# Patient Record
Sex: MALE | Race: BLACK OR AFRICAN AMERICAN | NOT HISPANIC OR LATINO | Employment: FULL TIME | URBAN - METROPOLITAN AREA
[De-identification: names, ages, dates, MRNs, and addresses within clinical notes are randomized per-mention and may not be internally consistent; named-entity substitution may affect disease eponyms.]

---

## 2017-09-25 ENCOUNTER — HOSPITAL ENCOUNTER (EMERGENCY)
Facility: HOSPITAL | Age: 67
Discharge: ANOTHER HOSPITAL | End: 2017-09-25
Attending: EMERGENCY MEDICINE
Payer: MEDICARE

## 2017-09-25 ENCOUNTER — HOSPITAL ENCOUNTER (OUTPATIENT)
Facility: HOSPITAL | Age: 67
Discharge: HOME | End: 2017-09-26
Attending: EMERGENCY MEDICINE | Admitting: EMERGENCY MEDICINE
Payer: MEDICARE

## 2017-09-25 VITALS
WEIGHT: 265 LBS | HEART RATE: 100 BPM | OXYGEN SATURATION: 94 % | RESPIRATION RATE: 18 BRPM | TEMPERATURE: 99 F | HEIGHT: 77 IN | BODY MASS INDEX: 31.29 KG/M2 | DIASTOLIC BLOOD PRESSURE: 98 MMHG | SYSTOLIC BLOOD PRESSURE: 181 MMHG

## 2017-09-25 DIAGNOSIS — R41.82 ALTERED MENTAL STATUS: ICD-10-CM

## 2017-09-25 DIAGNOSIS — G93.40 ENCEPHALOPATHY ACUTE: ICD-10-CM

## 2017-09-25 DIAGNOSIS — R56.9 SEIZURE: ICD-10-CM

## 2017-09-25 DIAGNOSIS — R41.82 ALTERED MENTAL STATUS, UNSPECIFIED ALTERED MENTAL STATUS TYPE: ICD-10-CM

## 2017-09-25 DIAGNOSIS — I35.9 NONRHEUMATIC AORTIC VALVE DISORDER: ICD-10-CM

## 2017-09-25 DIAGNOSIS — I10 BENIGN ESSENTIAL HTN: ICD-10-CM

## 2017-09-25 DIAGNOSIS — R56.9 SEIZURES: Primary | ICD-10-CM

## 2017-09-25 LAB
ALBUMIN SERPL BCP-MCNC: 3.8 G/DL
ALP SERPL-CCNC: 112 U/L
ALT SERPL W/O P-5'-P-CCNC: 34 U/L
AMPHET+METHAMPHET UR QL: NEGATIVE
ANION GAP SERPL CALC-SCNC: 21 MMOL/L
AST SERPL-CCNC: 35 U/L
BACTERIA #/AREA URNS HPF: NORMAL /HPF
BARBITURATES UR QL SCN>200 NG/ML: NEGATIVE
BASOPHILS # BLD AUTO: 0.03 K/UL
BASOPHILS NFR BLD: 0.2 %
BENZODIAZ UR QL SCN>200 NG/ML: NEGATIVE
BILIRUB SERPL-MCNC: 0.5 MG/DL
BILIRUB UR QL STRIP: NEGATIVE
BUN SERPL-MCNC: 13 MG/DL
BZE UR QL SCN: NEGATIVE
CALCIUM SERPL-MCNC: 9.7 MG/DL
CANNABINOIDS UR QL SCN: NEGATIVE
CHLORIDE SERPL-SCNC: 104 MMOL/L
CK SERPL-CCNC: 125 U/L
CLARITY UR: CLEAR
CO2 SERPL-SCNC: 12 MMOL/L
COLOR UR: YELLOW
CREAT SERPL-MCNC: 1.4 MG/DL
CREAT UR-MCNC: 84 MG/DL
DIFFERENTIAL METHOD: ABNORMAL
EOSINOPHIL # BLD AUTO: 0.2 K/UL
EOSINOPHIL NFR BLD: 1 %
ERYTHROCYTE [DISTWIDTH] IN BLOOD BY AUTOMATED COUNT: 13.2 %
EST. GFR  (AFRICAN AMERICAN): 60 ML/MIN/1.73 M^2
EST. GFR  (NON AFRICAN AMERICAN): 52 ML/MIN/1.73 M^2
ETHANOL SERPL-MCNC: <10 MG/DL
GLUCOSE SERPL-MCNC: 201 MG/DL
GLUCOSE UR QL STRIP: NEGATIVE
HCT VFR BLD AUTO: 47.9 %
HGB BLD-MCNC: 16.1 G/DL
HGB UR QL STRIP: ABNORMAL
HYALINE CASTS #/AREA URNS LPF: 0 /LPF
INR PPP: 1
KETONES UR QL STRIP: NEGATIVE
LACTATE SERPL-SCNC: 3.1 MMOL/L
LACTATE SERPL-SCNC: >12 MMOL/L
LEUKOCYTE ESTERASE UR QL STRIP: NEGATIVE
LYMPHOCYTES # BLD AUTO: 4.1 K/UL
LYMPHOCYTES NFR BLD: 27.4 %
MAGNESIUM SERPL-MCNC: 2.7 MG/DL
MCH RBC QN AUTO: 31.3 PG
MCHC RBC AUTO-ENTMCNC: 33.6 G/DL
MCV RBC AUTO: 93 FL
METHADONE UR QL SCN>300 NG/ML: NEGATIVE
MICROSCOPIC COMMENT: NORMAL
MONOCYTES # BLD AUTO: 0.6 K/UL
MONOCYTES NFR BLD: 3.7 %
NEUTROPHILS # BLD AUTO: 9.9 K/UL
NEUTROPHILS NFR BLD: 66.4 %
NITRITE UR QL STRIP: NEGATIVE
OPIATES UR QL SCN: NEGATIVE
PCP UR QL SCN>25 NG/ML: NEGATIVE
PH UR STRIP: 6 [PH] (ref 5–8)
PHOSPHATE SERPL-MCNC: 2.6 MG/DL
PLATELET # BLD AUTO: 279 K/UL
PMV BLD AUTO: 10 FL
POCT GLUCOSE: 88 MG/DL (ref 70–110)
POTASSIUM SERPL-SCNC: 4 MMOL/L
PROT SERPL-MCNC: 8.6 G/DL
PROT UR QL STRIP: ABNORMAL
PROTHROMBIN TIME: 10.6 SEC
RBC # BLD AUTO: 5.15 M/UL
RBC #/AREA URNS HPF: 2 /HPF (ref 0–4)
SODIUM SERPL-SCNC: 137 MMOL/L
SP GR UR STRIP: >=1.03 (ref 1–1.03)
SQUAMOUS #/AREA URNS HPF: 2 /HPF
TOXICOLOGY INFORMATION: NORMAL
TROPONIN I SERPL DL<=0.01 NG/ML-MCNC: <0.006 NG/ML
URN SPEC COLLECT METH UR: ABNORMAL
UROBILINOGEN UR STRIP-ACNC: NEGATIVE EU/DL
WBC # BLD AUTO: 14.91 K/UL
WBC #/AREA URNS HPF: 0 /HPF (ref 0–5)

## 2017-09-25 PROCEDURE — 80320 DRUG SCREEN QUANTALCOHOLS: CPT

## 2017-09-25 PROCEDURE — 84100 ASSAY OF PHOSPHORUS: CPT

## 2017-09-25 PROCEDURE — 25000003 PHARM REV CODE 250: Performed by: EMERGENCY MEDICINE

## 2017-09-25 PROCEDURE — 99285 EMERGENCY DEPT VISIT HI MDM: CPT | Mod: ,,, | Performed by: EMERGENCY MEDICINE

## 2017-09-25 PROCEDURE — 85025 COMPLETE CBC W/AUTO DIFF WBC: CPT

## 2017-09-25 PROCEDURE — 80053 COMPREHEN METABOLIC PANEL: CPT

## 2017-09-25 PROCEDURE — 82962 GLUCOSE BLOOD TEST: CPT

## 2017-09-25 PROCEDURE — 93005 ELECTROCARDIOGRAM TRACING: CPT

## 2017-09-25 PROCEDURE — G0378 HOSPITAL OBSERVATION PER HR: HCPCS

## 2017-09-25 PROCEDURE — 85610 PROTHROMBIN TIME: CPT

## 2017-09-25 PROCEDURE — 63600175 PHARM REV CODE 636 W HCPCS

## 2017-09-25 PROCEDURE — 81000 URINALYSIS NONAUTO W/SCOPE: CPT

## 2017-09-25 PROCEDURE — 80307 DRUG TEST PRSMV CHEM ANLYZR: CPT

## 2017-09-25 PROCEDURE — 96375 TX/PRO/DX INJ NEW DRUG ADDON: CPT

## 2017-09-25 PROCEDURE — 83605 ASSAY OF LACTIC ACID: CPT

## 2017-09-25 PROCEDURE — 93010 ELECTROCARDIOGRAM REPORT: CPT | Mod: ,,, | Performed by: INTERNAL MEDICINE

## 2017-09-25 PROCEDURE — 82550 ASSAY OF CK (CPK): CPT

## 2017-09-25 PROCEDURE — 96376 TX/PRO/DX INJ SAME DRUG ADON: CPT

## 2017-09-25 PROCEDURE — 95951 PR EEG MONITORING/VIDEORECORD: CPT | Mod: 26,52,, | Performed by: PSYCHIATRY & NEUROLOGY

## 2017-09-25 PROCEDURE — 25500020 PHARM REV CODE 255: Performed by: EMERGENCY MEDICINE

## 2017-09-25 PROCEDURE — 84484 ASSAY OF TROPONIN QUANT: CPT

## 2017-09-25 PROCEDURE — 96365 THER/PROPH/DIAG IV INF INIT: CPT

## 2017-09-25 PROCEDURE — 83735 ASSAY OF MAGNESIUM: CPT

## 2017-09-25 PROCEDURE — 99285 EMERGENCY DEPT VISIT HI MDM: CPT | Mod: 25

## 2017-09-25 PROCEDURE — 99220 PR INITIAL OBSERVATION CARE,LEVL III: CPT | Mod: ,,, | Performed by: PHYSICIAN ASSISTANT

## 2017-09-25 PROCEDURE — 99285 EMERGENCY DEPT VISIT HI MDM: CPT | Mod: 25,27

## 2017-09-25 PROCEDURE — 99285 EMERGENCY DEPT VISIT HI MDM: CPT | Mod: GC,,, | Performed by: PSYCHIATRY & NEUROLOGY

## 2017-09-25 PROCEDURE — 96361 HYDRATE IV INFUSION ADD-ON: CPT

## 2017-09-25 PROCEDURE — 63600175 PHARM REV CODE 636 W HCPCS: Performed by: EMERGENCY MEDICINE

## 2017-09-25 RX ORDER — LORAZEPAM 2 MG/ML
INJECTION INTRAMUSCULAR
Status: COMPLETED
Start: 2017-09-25 | End: 2017-09-25

## 2017-09-25 RX ORDER — SODIUM CHLORIDE 9 MG/ML
1000 INJECTION, SOLUTION INTRAVENOUS
Status: DISCONTINUED | OUTPATIENT
Start: 2017-09-25 | End: 2017-09-25 | Stop reason: HOSPADM

## 2017-09-25 RX ORDER — SODIUM CHLORIDE 9 MG/ML
1000 INJECTION, SOLUTION INTRAVENOUS
Status: COMPLETED | OUTPATIENT
Start: 2017-09-25 | End: 2017-09-25

## 2017-09-25 RX ADMIN — IOHEXOL 100 ML: 350 INJECTION, SOLUTION INTRAVENOUS at 01:09

## 2017-09-25 RX ADMIN — SODIUM CHLORIDE 1000 ML: 0.9 INJECTION, SOLUTION INTRAVENOUS at 10:09

## 2017-09-25 RX ADMIN — LORAZEPAM 2 MG: 2 INJECTION INTRAMUSCULAR; INTRAVENOUS at 08:09

## 2017-09-25 RX ADMIN — DEXTROSE 2000 MG: 50 INJECTION, SOLUTION INTRAVENOUS at 12:09

## 2017-09-25 RX ADMIN — DEXTROSE 2000 MG: 50 INJECTION, SOLUTION INTRAVENOUS at 05:09

## 2017-09-25 NOTE — ED NOTES
Pt resting on stretcher at this time. On cardiac, bp and o2 sat monitor. Family at bedside. SR up and CB in reach. No seizure activity since his arrival to ER.
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
## 2017-09-25 NOTE — ED NOTES
Pt alert but still having episodes of disorientation to place and time. On cardiac, bp and o2 sat monitor.  SR up and CB in reach. Family at bedside. No seizure activity noted.

## 2017-09-25 NOTE — ED NOTES
Pt identifiers checked and correct.    LOC: The patient is awake, alert and aware of environment with an appropriate affect, the patient is oriented x 3 and speaking appropriately.   APPEARANCE: Patient resting comfortably and in no acute distress, patient is clean and well groomed, patient's clothing is properly fastened.   SKIN: The skin is warm and dry, color consistent with ethnicity, patient has normal skin turgor and moist mucus membranes, skin intact, no breakdown or bruising noted.   MUSCULOSKELETAL: Patient moving all extremities spontaneously, no obvious swelling or deformities noted.   RESPIRATORY: Airway is open and patent, respirations are spontaneous, patient has a normal effort and rate, no accessory muscle use noted.   CARDIAC: Patient has a normal rate and regular rhythm, no periphreal edema noted, capillary refill < 3 seconds.   ABDOMEN: Soft and non tender to palpation, no distention noted, active bowel sounds present in all four quadrants.   NEUROLOGIC: PERRL, 3 mm bilaterally, eyes open spontaneously, behavior appropriate to situation, follows commands, facial expression symmetrical, bilateral hand grasp equal and even, purposeful motor response noted, normal sensation in all extremities when touched with a finger.  Sleepy yet AAOx3.  Follows all commands.

## 2017-09-25 NOTE — ED NOTES
Pt taken  to CT via stretcher per nurse and nurse tech. Pt is postictal and combative at this time. Soft restraints in place. Medical intervention ordered per Dr. Sawant to sedate pt for CT.

## 2017-09-25 NOTE — ED NOTES
Pt becoming more alert. Sleeping but awakens to name. Able to state his name and birthday. Family at bedside. On cardiac, bp and o2 sat monitor. SR up and CB in reach. Restraints discontinued at this time.
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
## 2017-09-25 NOTE — ED NOTES
Family at bedside. SR up and CB in reach. On cardiac, bp and o2 sat monitor. Oxygen on at 15L per nrb. Visible from nurses station. Restraints in place- f2f done at this time.

## 2017-09-25 NOTE — ED NOTES
Bed: 25  Expected date: 9/25/17  Expected time: 6:14 PM  Means of arrival:   Comments:  Transfer/ [facility]
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
## 2017-09-26 VITALS
RESPIRATION RATE: 16 BRPM | HEART RATE: 69 BPM | TEMPERATURE: 98 F | SYSTOLIC BLOOD PRESSURE: 185 MMHG | BODY MASS INDEX: 29.47 KG/M2 | DIASTOLIC BLOOD PRESSURE: 92 MMHG | WEIGHT: 242 LBS | HEIGHT: 76 IN | OXYGEN SATURATION: 95 %

## 2017-09-26 PROBLEM — R56.9 SEIZURES: Status: RESOLVED | Noted: 2017-09-25 | Resolved: 2017-09-26

## 2017-09-26 PROBLEM — E87.20 LACTIC ACIDOSIS: Status: RESOLVED | Noted: 2017-09-26 | Resolved: 2017-09-26

## 2017-09-26 PROBLEM — G93.40 ENCEPHALOPATHY ACUTE: Status: ACTIVE | Noted: 2017-09-26

## 2017-09-26 PROBLEM — R73.9 HYPERGLYCEMIA: Status: RESOLVED | Noted: 2017-09-26 | Resolved: 2017-09-26

## 2017-09-26 PROBLEM — I10 BENIGN ESSENTIAL HTN: Status: ACTIVE | Noted: 2017-09-26

## 2017-09-26 PROBLEM — R80.9 PROTEINURIA: Status: ACTIVE | Noted: 2017-09-26

## 2017-09-26 PROBLEM — E87.20 LACTIC ACIDOSIS: Status: ACTIVE | Noted: 2017-09-26

## 2017-09-26 PROBLEM — G93.40 ENCEPHALOPATHY ACUTE: Status: RESOLVED | Noted: 2017-09-26 | Resolved: 2017-09-26

## 2017-09-26 PROBLEM — R73.9 HYPERGLYCEMIA: Status: ACTIVE | Noted: 2017-09-26

## 2017-09-26 LAB
ALBUMIN SERPL BCP-MCNC: 3.1 G/DL
ALLENS TEST: ABNORMAL
ALP SERPL-CCNC: 87 U/L
ALT SERPL W/O P-5'-P-CCNC: 39 U/L
AMMONIA PLAS-SCNC: 38 UMOL/L
ANION GAP SERPL CALC-SCNC: 9 MMOL/L
AST SERPL-CCNC: 72 U/L
B-OH-BUTYR BLD STRIP-SCNC: 0.2 MMOL/L
BASOPHILS # BLD AUTO: 0.01 K/UL
BASOPHILS NFR BLD: 0.1 %
BILIRUB SERPL-MCNC: 1.4 MG/DL
BUN SERPL-MCNC: 15 MG/DL
CALCIUM SERPL-MCNC: 9.4 MG/DL
CHLORIDE SERPL-SCNC: 109 MMOL/L
CK SERPL-CCNC: 1884 U/L
CK SERPL-CCNC: 1899 U/L
CO2 SERPL-SCNC: 24 MMOL/L
CREAT SERPL-MCNC: 1.2 MG/DL
DELSYS: ABNORMAL
DIASTOLIC DYSFUNCTION: NO
DIFFERENTIAL METHOD: ABNORMAL
EOSINOPHIL # BLD AUTO: 0.1 K/UL
EOSINOPHIL NFR BLD: 0.8 %
ERYTHROCYTE [DISTWIDTH] IN BLOOD BY AUTOMATED COUNT: 14 %
EST. GFR  (AFRICAN AMERICAN): >60 ML/MIN/1.73 M^2
EST. GFR  (NON AFRICAN AMERICAN): >60 ML/MIN/1.73 M^2
ESTIMATED AVG GLUCOSE: 117 MG/DL
ESTIMATED PA SYSTOLIC PRESSURE: 25.09
FOLATE SERPL-MCNC: 12.7 NG/ML
GLUCOSE SERPL-MCNC: 110 MG/DL
HBA1C MFR BLD HPLC: 5.7 %
HCO3 UR-SCNC: 23.2 MMOL/L (ref 24–28)
HCT VFR BLD AUTO: 49.7 %
HGB BLD-MCNC: 17 G/DL
HIV1+2 IGG SERPL QL IA.RAPID: NEGATIVE
LACTATE SERPL-SCNC: 2 MMOL/L
LYMPHOCYTES # BLD AUTO: 2.4 K/UL
LYMPHOCYTES NFR BLD: 27.5 %
MAGNESIUM SERPL-MCNC: 2.2 MG/DL
MCH RBC QN AUTO: 31.1 PG
MCHC RBC AUTO-ENTMCNC: 34.2 G/DL
MCV RBC AUTO: 91 FL
MODE: ABNORMAL
MONOCYTES # BLD AUTO: 0.8 K/UL
MONOCYTES NFR BLD: 8.6 %
NEUTROPHILS # BLD AUTO: 5.5 K/UL
NEUTROPHILS NFR BLD: 62.8 %
PCO2 BLDA: 39.7 MMHG (ref 35–45)
PH SMN: 7.37 [PH] (ref 7.35–7.45)
PLATELET # BLD AUTO: 239 K/UL
PMV BLD AUTO: 10.2 FL
PO2 BLDA: 71 MMHG (ref 80–100)
POC BE: -2 MMOL/L
POC SATURATED O2: 94 % (ref 95–100)
POC TCO2: 24 MMOL/L (ref 23–27)
POCT GLUCOSE: 76 MG/DL (ref 70–110)
POCT GLUCOSE: 95 MG/DL (ref 70–110)
POTASSIUM SERPL-SCNC: 3.8 MMOL/L
PROCALCITONIN SERPL IA-MCNC: 0.78 NG/ML
PROT SERPL-MCNC: 7.5 G/DL
RBC # BLD AUTO: 5.46 M/UL
RETIRED EF AND QEF - SEE NOTES: 65 (ref 55–65)
SAMPLE: ABNORMAL
SITE: ABNORMAL
SODIUM SERPL-SCNC: 142 MMOL/L
SP02: 92
TRICUSPID VALVE REGURGITATION: NORMAL
VIT B12 SERPL-MCNC: 370 PG/ML
WBC # BLD AUTO: 8.75 K/UL

## 2017-09-26 PROCEDURE — 93306 TTE W/DOPPLER COMPLETE: CPT | Mod: 26,,, | Performed by: INTERNAL MEDICINE

## 2017-09-26 PROCEDURE — 82140 ASSAY OF AMMONIA: CPT

## 2017-09-26 PROCEDURE — 93010 ELECTROCARDIOGRAM REPORT: CPT | Mod: ,,, | Performed by: INTERNAL MEDICINE

## 2017-09-26 PROCEDURE — G0378 HOSPITAL OBSERVATION PER HR: HCPCS

## 2017-09-26 PROCEDURE — 99217 PR OBSERVATION CARE DISCHARGE: CPT | Mod: ,,, | Performed by: HOSPITALIST

## 2017-09-26 PROCEDURE — 95816 EEG AWAKE AND DROWSY: CPT

## 2017-09-26 PROCEDURE — 25000003 PHARM REV CODE 250: Performed by: HOSPITALIST

## 2017-09-26 PROCEDURE — 87040 BLOOD CULTURE FOR BACTERIA: CPT

## 2017-09-26 PROCEDURE — 82607 VITAMIN B-12: CPT

## 2017-09-26 PROCEDURE — 25000003 PHARM REV CODE 250: Performed by: PHYSICIAN ASSISTANT

## 2017-09-26 PROCEDURE — 95813 EEG EXTND MNTR 61-119 MIN: CPT | Mod: 26,,, | Performed by: PSYCHIATRY & NEUROLOGY

## 2017-09-26 PROCEDURE — 93005 ELECTROCARDIOGRAM TRACING: CPT

## 2017-09-26 PROCEDURE — 83605 ASSAY OF LACTIC ACID: CPT

## 2017-09-26 PROCEDURE — 93306 TTE W/DOPPLER COMPLETE: CPT

## 2017-09-26 PROCEDURE — 82550 ASSAY OF CK (CPK): CPT | Mod: 91

## 2017-09-26 PROCEDURE — 82550 ASSAY OF CK (CPK): CPT

## 2017-09-26 PROCEDURE — 86703 HIV-1/HIV-2 1 RESULT ANTBDY: CPT

## 2017-09-26 PROCEDURE — 84207 ASSAY OF VITAMIN B-6: CPT

## 2017-09-26 PROCEDURE — 82746 ASSAY OF FOLIC ACID SERUM: CPT

## 2017-09-26 PROCEDURE — 36415 COLL VENOUS BLD VENIPUNCTURE: CPT

## 2017-09-26 PROCEDURE — 80053 COMPREHEN METABOLIC PANEL: CPT

## 2017-09-26 PROCEDURE — 99215 OFFICE O/P EST HI 40 MIN: CPT | Mod: ,,, | Performed by: PSYCHIATRY & NEUROLOGY

## 2017-09-26 PROCEDURE — 83735 ASSAY OF MAGNESIUM: CPT

## 2017-09-26 PROCEDURE — 83036 HEMOGLOBIN GLYCOSYLATED A1C: CPT

## 2017-09-26 PROCEDURE — 63600175 PHARM REV CODE 636 W HCPCS: Performed by: PHYSICIAN ASSISTANT

## 2017-09-26 PROCEDURE — 85025 COMPLETE CBC W/AUTO DIFF WBC: CPT

## 2017-09-26 PROCEDURE — 36600 WITHDRAWAL OF ARTERIAL BLOOD: CPT

## 2017-09-26 PROCEDURE — 82803 BLOOD GASES ANY COMBINATION: CPT

## 2017-09-26 PROCEDURE — 84145 PROCALCITONIN (PCT): CPT

## 2017-09-26 PROCEDURE — 82010 KETONE BODYS QUAN: CPT

## 2017-09-26 RX ORDER — HYDRALAZINE HYDROCHLORIDE 25 MG/1
25 TABLET, FILM COATED ORAL EVERY 8 HOURS PRN
Status: DISCONTINUED | OUTPATIENT
Start: 2017-09-26 | End: 2017-09-26 | Stop reason: HOSPADM

## 2017-09-26 RX ORDER — BISACODYL 10 MG
10 SUPPOSITORY, RECTAL RECTAL DAILY PRN
Status: DISCONTINUED | OUTPATIENT
Start: 2017-09-26 | End: 2017-09-26 | Stop reason: HOSPADM

## 2017-09-26 RX ORDER — AMLODIPINE BESYLATE 5 MG/1
5 TABLET ORAL DAILY
Qty: 30 TABLET | Refills: 3 | Status: SHIPPED | OUTPATIENT
Start: 2017-09-26 | End: 2017-09-26

## 2017-09-26 RX ORDER — INSULIN ASPART 100 [IU]/ML
0-5 INJECTION, SOLUTION INTRAVENOUS; SUBCUTANEOUS
Status: DISCONTINUED | OUTPATIENT
Start: 2017-09-26 | End: 2017-09-26 | Stop reason: HOSPADM

## 2017-09-26 RX ORDER — IPRATROPIUM BROMIDE AND ALBUTEROL SULFATE 2.5; .5 MG/3ML; MG/3ML
3 SOLUTION RESPIRATORY (INHALATION) EVERY 4 HOURS PRN
Status: DISCONTINUED | OUTPATIENT
Start: 2017-09-26 | End: 2017-09-26 | Stop reason: HOSPADM

## 2017-09-26 RX ORDER — AMLODIPINE BESYLATE 5 MG/1
5 TABLET ORAL ONCE
Status: COMPLETED | OUTPATIENT
Start: 2017-09-26 | End: 2017-09-26

## 2017-09-26 RX ORDER — IBUPROFEN 200 MG
16 TABLET ORAL
Status: DISCONTINUED | OUTPATIENT
Start: 2017-09-26 | End: 2017-09-26 | Stop reason: HOSPADM

## 2017-09-26 RX ORDER — AMLODIPINE BESYLATE 5 MG/1
5 TABLET ORAL DAILY
Qty: 30 TABLET | Refills: 2 | Status: SHIPPED | OUTPATIENT
Start: 2017-09-26 | End: 2017-10-26

## 2017-09-26 RX ORDER — ACETAMINOPHEN 325 MG/1
650 TABLET ORAL EVERY 4 HOURS PRN
Status: DISCONTINUED | OUTPATIENT
Start: 2017-09-26 | End: 2017-09-26 | Stop reason: HOSPADM

## 2017-09-26 RX ORDER — ACETYLCYSTEINE 200 MG/ML
600 SOLUTION ORAL; RESPIRATORY (INHALATION) EVERY 12 HOURS
Status: DISCONTINUED | OUTPATIENT
Start: 2017-09-26 | End: 2017-09-26

## 2017-09-26 RX ORDER — HYDROCODONE BITARTRATE AND ACETAMINOPHEN 10; 325 MG/1; MG/1
1 TABLET ORAL EVERY 4 HOURS PRN
Status: DISCONTINUED | OUTPATIENT
Start: 2017-09-26 | End: 2017-09-26 | Stop reason: HOSPADM

## 2017-09-26 RX ORDER — POLYETHYLENE GLYCOL 3350 17 G/17G
17 POWDER, FOR SOLUTION ORAL DAILY PRN
Status: DISCONTINUED | OUTPATIENT
Start: 2017-09-26 | End: 2017-09-26 | Stop reason: HOSPADM

## 2017-09-26 RX ORDER — METOCLOPRAMIDE HYDROCHLORIDE 5 MG/ML
5 INJECTION INTRAMUSCULAR; INTRAVENOUS EVERY 6 HOURS PRN
Status: DISCONTINUED | OUTPATIENT
Start: 2017-09-26 | End: 2017-09-26 | Stop reason: HOSPADM

## 2017-09-26 RX ORDER — IBUPROFEN 200 MG
24 TABLET ORAL
Status: DISCONTINUED | OUTPATIENT
Start: 2017-09-26 | End: 2017-09-26 | Stop reason: HOSPADM

## 2017-09-26 RX ORDER — HYDROCODONE BITARTRATE AND ACETAMINOPHEN 5; 325 MG/1; MG/1
1 TABLET ORAL EVERY 4 HOURS PRN
Status: DISCONTINUED | OUTPATIENT
Start: 2017-09-26 | End: 2017-09-26 | Stop reason: HOSPADM

## 2017-09-26 RX ORDER — RAMELTEON 8 MG/1
8 TABLET ORAL NIGHTLY PRN
Status: DISCONTINUED | OUTPATIENT
Start: 2017-09-26 | End: 2017-09-26 | Stop reason: HOSPADM

## 2017-09-26 RX ORDER — GLUCAGON 1 MG
1 KIT INJECTION
Status: DISCONTINUED | OUTPATIENT
Start: 2017-09-26 | End: 2017-09-26 | Stop reason: HOSPADM

## 2017-09-26 RX ORDER — HEPARIN SODIUM 5000 [USP'U]/ML
5000 INJECTION, SOLUTION INTRAVENOUS; SUBCUTANEOUS EVERY 8 HOURS
Status: DISCONTINUED | OUTPATIENT
Start: 2017-09-26 | End: 2017-09-26 | Stop reason: HOSPADM

## 2017-09-26 RX ORDER — PANTOPRAZOLE SODIUM 40 MG/1
40 TABLET, DELAYED RELEASE ORAL DAILY
Status: DISCONTINUED | OUTPATIENT
Start: 2017-09-26 | End: 2017-09-26 | Stop reason: HOSPADM

## 2017-09-26 RX ORDER — LEVETIRACETAM 500 MG/1
500 TABLET ORAL EVERY 12 HOURS
Status: DISCONTINUED | OUTPATIENT
Start: 2017-09-26 | End: 2017-09-26

## 2017-09-26 RX ORDER — ONDANSETRON 2 MG/ML
4 INJECTION INTRAMUSCULAR; INTRAVENOUS EVERY 12 HOURS PRN
Status: DISCONTINUED | OUTPATIENT
Start: 2017-09-26 | End: 2017-09-26 | Stop reason: HOSPADM

## 2017-09-26 RX ADMIN — AMLODIPINE BESYLATE 5 MG: 5 TABLET ORAL at 06:09

## 2017-09-26 RX ADMIN — HEPARIN SODIUM 5000 UNITS: 5000 INJECTION, SOLUTION INTRAVENOUS; SUBCUTANEOUS at 02:09

## 2017-09-26 RX ADMIN — LEVETIRACETAM 500 MG: 500 TABLET ORAL at 09:09

## 2017-09-26 RX ADMIN — SODIUM CHLORIDE 1000 ML: 0.9 INJECTION, SOLUTION INTRAVENOUS at 05:09

## 2017-09-26 RX ADMIN — HEPARIN SODIUM 5000 UNITS: 5000 INJECTION, SOLUTION INTRAVENOUS; SUBCUTANEOUS at 05:09

## 2017-09-26 RX ADMIN — PANTOPRAZOLE SODIUM 40 MG: 40 TABLET, DELAYED RELEASE ORAL at 09:09

## 2017-09-26 NOTE — PROCEDURES
DATE OF PROCEDURE:  09/26/2017    EEG NUMBER:  [id number]    REQUESTED BY:  Dr. Braga.    LOCATION OF SERVICE:  728    ELECTROENCEPHALOGRAM REPORT    METHODOLOGY:  Electroencephalographic (EEG) recording is recorded with   electrodes placed according to the International 10-20 placement system.  Thirty   two (32) channels of digital signal (sampling rate of 512/sec), including T1   and T2, were simultaneously recorded from the scalp and may include EKG, EMG,   and/or eye monitors.  Recording band pass was 0.1 to 512 Hz.  Digital video   recording of the patient is simultaneously recorded with the EEG.  The patient   is instructed to report clinical symptoms which may occur during the recording   session.  EEG and video recording are stored and archived in digital format.    Activation procedures, which include photic stimulation, hyperventilation and   instructing patients to perform simple tasks, are done in selected patients.    The EEG is displayed on a monitor screen and can be reviewed using different   montages.  Computer assisted-analysis is employed to detect spike and   electrographic seizure activity.  The entire record is submitted for computer   analysis.  The entire recording is visually reviewed, and the times identified   by computer analysis as being spikes or seizures are reviewed again.    Compressed spectral analysis (CSA) is also performed on the activity recorded   from each individual channel.  This is displayed as a power display of   frequencies from 0 to 30 Hz over time.  The CSA is reviewed looking for   asymmetries in power between homologous areas of the scalp, then compared with   the original EEG recording.    iBid2Save software was also utilized in the review of this study.  This software   suite analyzes the EEG recording in multiple domains.  Coherence and rhythmicity   are computed to identify EEG sections which may contain organized seizures.    Each channel undergoes analysis to  detect the presence of spike and sharp waves   which have special and morphological characteristics of epileptic activity.  The   routine EEG recording is converted from special into frequency domain.  This is   then displayed comparing homologous areas to identify areas of significant   asymmetry.  Algorithm to identify non-cortically generated artifact is used to   separate artifact from the EEG.    EEG FINDINGS:  Recording was obtained at the patient's bedside.  The patient was   asleep at the onset of the recording.  Background was a generalized mixture of   theta and fairly rhythmic 9 to 10 Hz alpha along with 14 to 18 Hz beta spindles.    Later, the patient awoke and a posterior dominant rhythm was present, which   was an irregular 10 Hz frequency seen in the occipital, parietal, and posterior   temporal regions bilaterally.  Low voltage irregular beta was present in the mid   and frontal region.  The patient was moving around during the recording, which   produced some movement artifact.  There were no clear lateralized or focal   findings and no spike or sharp wave activity recorded.  He was asked to count,   which he did correctly and the background did not change.  Activation procedures   were not carried out.    IMPRESSION:  Normal waking and sleeping EEG.    CLINICAL CORRELATION:  The patient is a 67-year-old male with HTN who presented   to the Emergency Room with multiple seizure-like episodes; however, this   recording shows no evidence for cortical dysfunction nor an epileptic process.      RR/HN  dd: 09/26/2017 14:42:30 (CDT)  td: 09/26/2017 15:05:41 (CDT)  Doc ID   #[id number]  Job ID #[id number]    CC:

## 2017-09-26 NOTE — ED NOTES
Pts family member becoming irate. Upset that she has been waiting so long for a bed for her . Pt made aware that a bed has been assigned and she is awaiting transport.

## 2017-09-26 NOTE — PROCEDURES
DATE OF PROCEDURE:  09/25/2017    EEG NUMBER:  [id number]    LOCATION OF SERVICE:  728    ELECTROENCEPHALOGRAM REPORT  Extended Recording    METHODOLOGY:  Electroencephalographic (EEG) is recorded with electrodes placed   according to the International 10-20 placement system.  Thirty two (32) channels   of digital signal (sampling rate of 512/sec), including T1 and T2, were   simultaneously recorded from the scalp and may include EKG, EMG, and/or eye   monitors.  Recording band pass was 0.1 to 512 Hz.  Digital video recording of   the patient is simultaneously recorded with the EEG.  The patient is instructed   to report clinical symptoms which may occur during the recording session.  EEG   and video recording are stored and archived in digital format.  Activation   procedures, which include photic stimulation, hyperventilation and instructing   patients to perform simple tasks, are done in selected patients.    The EEG is displayed on a monitor screen and can be reviewed using different   montages.  Computer-assisted analysis is employed to detect spike and   electrographic seizure activity.  The entire record is submitted for computer   analysis.  The entire recording is visually reviewed, and the times identified   by computer analysis as being spikes or seizures are reviewed again.    Compressed spectral analysis (CSA) is also performed on the activity recorded   from each individual channel.  This is displayed as a power display of   frequencies from 0 to 30 Hz over time.  The CSA is reviewed looking for   asymmetries in power between homologous areas of the scalp, then compared with   the original EEG recording.    Raydiance software was also utilized in the review of this study.  This software   suite analyzes the EEG recording in multiple domains.  Coherence and rhythmicity   are computed to identify EEG sections which may contain organized seizures.    Each channel undergoes analysis to detect the presence  of spike and sharp waves   which have special and morphological characteristics of epileptic activity.  The   routine EEG recording is converted from special into frequency domain.  This is   then displayed comparing homologous areas to identify areas of significant   asymmetry.  Algorithm to identify non-cortically generated artifact is used to   separate artifact from the EEG.    RECORDING TIMES:  Start on 09/25/2017 at 19:34   Stop on 09/26/2017 at 07:00   A total of 7 hours and 59 minutes of EEG recording was obtained.    EEG FINDINGS:  Recording was obtained at the patient's bedside in the hospital   room.  An electrode cap was employed to obtain immediate survey of   electrocortical activity.  The patient was resting quietly on a vent.  The   recording was somewhat limited as not all the contacts were functioning   correctly.  Electrodes on both hemispheres did record and the background   consisted of a generalized mixture of both lower range and midrange beta   activity intermixed.  There were also some alpha and theta frequencies noted.    Occasionally, a 10 Hz posterior dominant rhythm was seen in the occipital leads.    The patient was moving around and considerable amount of movement artifact was   present intermittently.  There was no electrographic seizure activity recorded.    IMPRESSION:  Abnormal EEG with slowing noted in some of the leads; however,   there was considerable artifact present, which limits the interpretability of   the recording.  Suggest the tracing be reestablished using a standard hookup to   continuing monitoring.      RR/HN  dd: 09/26/2017 11:03:17 (CDT)  td: 09/26/2017 11:24:53 (CDT)  Doc ID   #[id number]  Job ID #[id number]    CC:
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
## 2017-09-26 NOTE — ASSESSMENT & PLAN NOTE
- mixed encephalopathy  - metabolic vs hypertensive  - altered on interview (postictal?); oriented to person and date  - High risk for falls; fall precautions in place

## 2017-09-26 NOTE — PROCEDURES
DATE OF PROCEDURE:  09/26/2017    EEG NUMBER:  [id number]    LOCATION OF SERVICE:  728    ICU EEG/VIDEO MONITORING REPORT    METHODOLOGY:  Electroencephalographic (EEG) is recorded with electrodes placed   according to the International 10-20 placement system.  Thirty two (32) channels   of digital signal (sampling rate of 512/sec), including T1 and T2, were   simultaneously recorded from the scalp and may include EKG, EMG, and/or eye   monitors.  Recording band pass was 0.1 to 512 Hz.  Digital video recording of   the patient is simultaneously recorded with the EEG.  The patient is instructed   to report clinical symptoms which may occur during the recording session.  EEG   and video recording are stored and archived in digital format.  Activation   procedures, which include photic stimulation, hyperventilation and instructing   patients to perform simple tasks, are done in selected patients.    The EEG is displayed on a monitor screen and can be reviewed using different   montages.  Computer-assisted analysis is employed to detect spike and   electrographic seizure activity.  The entire record is submitted for computer   analysis.  The entire recording is visually reviewed, and the times identified   by computer analysis as being spikes or seizures are reviewed again.    Compressed spectral analysis (CSA) is also performed on the activity recorded   from each individual channel.  This is displayed as a power display of   frequencies from 0 to 30 Hz over time.  The CSA is reviewed looking for   asymmetries in power between homologous areas of the scalp, then compared with   the original EEG recording.    ink[employer] Digital software was also utilized in the review of this study.  This software   suite analyzes the EEG recording in multiple domains.  Coherence and rhythmicity   are computed to identify EEG sections which may contain organized seizures.    Each channel undergoes analysis to detect the presence of spike and  sharp waves   which have special and morphological characteristics of epileptic activity.  The   routine EEG recording is converted from special into frequency domain.  This is   then displayed comparing homologous areas to identify areas of significant   asymmetry.  Algorithm to identify non-cortically generated artifact is used to   separate artifact from the EEG.    RECORDING TIMES:  Start on 09/26/2017 at 07:00  Stop on 09/26/2017 at 08:15  A total of 1 hour 16 minutes of EEG was obtained.    EEG FINDINGS:  Recording was obtained at the patient's bedside in room 728.    Recording had been initiated the night before using electrode cap to an   immediate survey of electrocortical function.  There was some movement artifact   noted intermittently as the patient was moving around.  There was a very   prominent 9 to 10 Hz frequency, which was present in the mid and posterior head   regions and at times even extended in the frontal area.  There are intermixed   theta frequencies.  There was intermixed slower theta noted over the left   hemisphere.  There was no spike or sharp wave activity noted.  Activation   procedures were not carried out.    IMPRESSION:  Probably abnormal EEG with what appeared to be asymmetrical   findings with slower frequencies noted over the left hemisphere, which would   suggest a lateralized cortical dysfunction.  There is, however, quite a bit of   movement artifact present, which does limit the interpretability of the   recording.  No active seizures were recorded.    CLINICAL CORRELATION:  The patient is a 67-year-old male who came to the   Emergency Room for recurrent seizures and was reported to have multiple   tonic-clonic seizures while in the Emergency Room.  This tracing does show some   cortical dysfunction, which is felt to be more evident on the left side;   however, there was considerable movement, which partially limits the   interpretability of the recording.  No epileptic  activity was recorded.      RR/HN  dd: 09/26/2017 15:23:29 (CDT)  td: 09/26/2017 16:10:13 (CDT)  Doc ID   #[id number]  Job ID #[id number]    CC:

## 2017-09-26 NOTE — NURSING
Discontinued IV intact. Reviewed doscharge instructions with patient and wife. They gave me verbal feedback of understanding. Awaiting wheelchair for transport. Leaving with family home.

## 2017-09-26 NOTE — ASSESSMENT & PLAN NOTE
- glucose 201; No glycosuria  - No hx of diabetes  - increased urinary frequency for months, per wife  - Hg A1c, beta-hydroxybutyrate ordered

## 2017-09-26 NOTE — SUBJECTIVE & OBJECTIVE
Past Medical History:   Diagnosis Date    Hypertension     Seizures      History reviewed. No pertinent surgical history.   Current Facility-Administered Medications on File Prior to Encounter   Medication Dose Route Frequency Provider Last Rate Last Dose    [COMPLETED] 0.9%  NaCl infusion  1,000 mL Intravenous ED 1 Time Karla Sawant MD   Stopped at 09/25/17 1151    [COMPLETED] levetiracetam (KEPPRA) 2,000 mg in dextrose 5 % 100 mL IVPB  2,000 mg Intravenous ED 1 Time Karla Sawant MD   Stopped at 09/25/17 1309    [COMPLETED] levetiracetam (KEPPRA) 2,000 mg in dextrose 5 % 100 mL IVPB  2,000 mg Intravenous ED 1 Time Karla Sawant  mL/hr at 09/25/17 1743 2,000 mg at 09/25/17 1743    [COMPLETED] lorazepam (ATIVAN) 2 mg/mL injection        2 mg at 09/25/17 0835    [COMPLETED] omnipaque 350 iohexol 100 mL  100 mL Intravenous ONCE PRN Kalra Sawant MD   100 mL at 09/25/17 1354    [DISCONTINUED] 0.9%  NaCl infusion  1,000 mL Intravenous ED 1 Time Karla Sawant MD        [DISCONTINUED] pyridoxine (vitamin B6) (B-6) 300 mg in sodium chloride 0.9% 50 mL IVPB  300 mg Intravenous Once Karla Sawant MD         No current outpatient prescriptions on file prior to encounter.      Allergies: Review of patient's allergies indicates no known allergies.    Family History   Problem Relation Age of Onset    Hypertension Neg Hx     Depression Neg Hx     Diabetes Neg Hx     Cancer Neg Hx      Social History   Substance Use Topics    Smoking status: Never Smoker    Smokeless tobacco: Never Used    Alcohol use No     Review of Systems   Unable to perform ROS: Mental status change   Neurological: Positive for headaches.   Psychiatric/Behavioral: Positive for confusion.     Objective:     Vitals:  Temp: 98 °F (36.7 °C) (09/25/17 1817)  Pulse: 96 (09/25/17 2200)  Resp: 18 (09/25/17 2105)  BP: (!) 157/87 (09/25/17 2200)  SpO2: 95 % (09/25/17 2200)    Temp:  [98 °F (36.7  °C)-99.1 °F (37.3 °C)] 98 °F (36.7 °C)  Pulse:  [] 96  Resp:  [18-20] 18  SpO2:  [94 %-97 %] 95 %  BP: (157-209)/() 157/87              No intake/output data recorded.    Physical Exam  Unable to test memory, judgment, insight, fund of knowledge, coordination, gait due to level of consciousness.  General   HEENT:   Chest Heart RRR / Lungs Clear to auscultation  Abdomen: Soft nontender + BS  Extremities: OK distal pulses.  Skin: UK  Neurological Exam:  MS; Mild lethargy, arousable and following commands. mood.  CN: II-XII Pupils reactive. No facial asymmetry.  Motor: LUE  4-5 /5 / RUE 4-5  /5  Tone normal bilaterally             LLE  4-5  /5 /  RLE 4-5 /5  Tone normal bilaterally  Sensory: LT/PP/T/ Vibration                  Complex sensory modalities: not tested  DTR:  normal throughout.  Coordination /Fine motor: Not assessed.  Gait: Not tested.  Meningeal signs: Absent. No apparent meningeal signs.  Today I personally reviewed pertinent medications, lines/drains/airways, imaging, cardiology, lab results, microbiology results, notably:

## 2017-09-26 NOTE — ASSESSMENT & PLAN NOTE
- Lactate >12 -->3.1; readings were ~4 hours apart   - Trend lactate  - ordered another CPK and lactic acid for AM  - Leukocytosis (WBC 14.91)  - Blood Cx ordered  - afebrile, VSS  - monitor leukocytosis.  May be related to seizure/stress rather than infectious.  - No SOB, wheezing. Respirations normal

## 2017-09-26 NOTE — HPI
Salas Luz is a 67 y.o. M who  has a past medical history of Hypertension.. The patient with new onset SZ like activity. The patent was transferred from [facility] to have Epilepsy evaluation and EEG monitoring. The patient was loaded with Keppra 4gr. He still continues somewhat confused. CT and CTA of the head done at the outside hospital were UK.
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
## 2017-09-26 NOTE — ASSESSMENT & PLAN NOTE
- Stopped taking lisinopril and another medication of unknown dosage x2-3 months ago  - Headaches increased for the last few months, per wife  - May need to start on 1st line treatment in setting of proteinuria, elevated bicarb   - PRN hydralazine ordered for SBP >180  - Seizure and abnormal lab values possibly d/t episode of hypertensive emergency vs lactic acidosis

## 2017-09-26 NOTE — NURSING
Paged IM L at 0430 to notify MD that  was unable to draw all of the pt's ordered labs d/t poor venous access.  Pt encouraged to drink water.  Waiting to hear back from MIKALA MCKEON at this time.     Spoke with MIKALA MCKEON.  Bolus of IVFs ordered and started for pt.  Lab techs trying to get labs again.    3 lab techs tried to draw blood on pt and were unsuccessful.  MIKALA MCKEON paged.  No new orders at this time.  Will wait to see how the IVFs work.  WCNIKO.

## 2017-09-26 NOTE — NURSING
Pt arrived to NSU via stretcher at 0118.  Pt able to ambulate to bed with 1 assist.  Wife, personal belongings, and portable EEG monitor with pt.  Pt had taken the EEG cap off in the ED and it was not put back on prior to transfer.  Pt's VS stable.  AAOx4.  Denies numbness/tingling and pain.  TEDs and SCDs placed on pt.  Urinal at bedside.  Fall risk band placed and renal diet menu given to pt.  Wife updated on all plan of care and any procedures that her  is having done.  She is very calm and understanding.  12-lead EKG done on pt while RN in room; EKG placed in pt's chart.  NCC RN called to come and place EEG cap on pt.  EEG monitoring started back at 0208.  EEG tech notified.  Pt is asleep now and wife is at bedside.  Seizure precautions maintained:  Padded side rails up x4, O2 and suction at bedside.  Bed alarm set, bed in lowest position with wheels locked, call light in reach.  WCNIKO.

## 2017-09-26 NOTE — ED NOTES
Pt sleeping on stretcher, respirations even and unlabored. Stretcher locked and in lowest position, side rails x 2, call bell within reach. BP cuff, cardiac monitor and pulse ox in place continuously. EEG cap remains in place. Family at the bedside, updated on wait for neurology consult. Will continue to monitor the patient.

## 2017-09-26 NOTE — ED NOTES
Pt sleeping on stretcher, respirations even and unlabored. Stretcher locked and in lowest position, side rails x 2, call bell within reach. Cardiac monitor, BP cuff and pulse ox in place continuously.  Family at bedside, updated on plan of care. Will reevaluate pt condition.
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
## 2017-09-26 NOTE — CONSULTS
[facility]  Neurocritical Care  Consult Note    Admit Date: 9/25/2017  Service Date: 09/25/2017  Length of Stay: 0    Consults  Subjective:     Chief Complaint: <principal problem not specified>    History of Present Illness: Salas Luz is a 67 y.o. M who  has a past medical history of Hypertension.. The patient with new onset SZ like activity. The patent was transferred from [facility] to have Epilepsy evaluation and EEG monitoring. The patient was loaded with Keppra 4gr. He still continues somewhat confused. CT and CTA of the head done at the outside hospital were [facility].         Past Medical History:   Diagnosis Date    Hypertension     Seizures      History reviewed. No pertinent surgical history.   Current Facility-Administered Medications on File Prior to Encounter   Medication Dose Route Frequency Provider Last Rate Last Dose    [COMPLETED] 0.9%  NaCl infusion  1,000 mL Intravenous ED 1 Time Karla Sawant MD   Stopped at 09/25/17 1151    [COMPLETED] levetiracetam (KEPPRA) 2,000 mg in dextrose 5 % 100 mL IVPB  2,000 mg Intravenous ED 1 Time Karla Sawant MD   Stopped at 09/25/17 1309    [COMPLETED] levetiracetam (KEPPRA) 2,000 mg in dextrose 5 % 100 mL IVPB  2,000 mg Intravenous ED 1 Time Karla Sawant  mL/hr at 09/25/17 1743 2,000 mg at 09/25/17 1743    [COMPLETED] lorazepam (ATIVAN) 2 mg/mL injection        2 mg at 09/25/17 0835    [COMPLETED] omnipaque 350 iohexol 100 mL  100 mL Intravenous ONCE PRN Karla Sawant MD   100 mL at 09/25/17 1354    [DISCONTINUED] 0.9%  NaCl infusion  1,000 mL Intravenous ED 1 Time Karla Sawant MD        [DISCONTINUED] pyridoxine (vitamin B6) (B-6) 300 mg in sodium chloride 0.9% 50 mL IVPB  300 mg Intravenous Once Karla Sawant MD         No current outpatient prescriptions on file prior to encounter.      Allergies: Review of patient's allergies indicates no known allergies.    Family History    Problem Relation Age of Onset    Hypertension Neg Hx     Depression Neg Hx     Diabetes Neg Hx     Cancer Neg Hx      Social History   Substance Use Topics    Smoking status: Never Smoker    Smokeless tobacco: Never Used    Alcohol use No     Review of Systems   Unable to perform ROS: Mental status change   Neurological: Positive for headaches.   Psychiatric/Behavioral: Positive for confusion.     Objective:     Vitals:  Temp: 98 °F (36.7 °C) (09/25/17 1817)  Pulse: 96 (09/25/17 2200)  Resp: 18 (09/25/17 2105)  BP: (!) 157/87 (09/25/17 2200)  SpO2: 95 % (09/25/17 2200)    Temp:  [98 °F (36.7 °C)-99.1 °F (37.3 °C)] 98 °F (36.7 °C)  Pulse:  [] 96  Resp:  [18-20] 18  SpO2:  [94 %-97 %] 95 %  BP: (157-209)/() 157/87              No intake/output data recorded.    Physical Exam  Unable to test memory, judgment, insight, fund of knowledge, coordination, gait due to level of consciousness.  General   HEENT:   Chest Heart RRR / Lungs Clear to auscultation  Abdomen: Soft nontender + BS  Extremities: OK distal pulses.  Skin: UK  Neurological Exam:  MS; Mild lethargy, arousable and following commands. mood.  CN: II-XII Pupils reactive. No facial asymmetry.  Motor: LUE  4-5 /5 / RUE 4-5  /5  Tone normal bilaterally             LLE  4-5  /5 /  RLE 4-5 /5  Tone normal bilaterally  Sensory: LT/PP/T/ Vibration                  Complex sensory modalities: not tested  DTR:  normal throughout.  Coordination /Fine motor: Not assessed.  Gait: Not tested.  Meningeal signs: Absent. No apparent meningeal signs.  Today I personally reviewed pertinent medications, lines/drains/airways, imaging, cardiology, lab results, microbiology results, notably:      Assessment/Plan:     No new Assessment & Plan notes have been filed under this hospital service since the last note was generated.  Service: Neuro Critical Care    Active Problem List:   1.New onset SZ.   2. Mixed ecnephalopathy  3. Lactic acidosis.  4. Acute on  chronic  renal insufficiency.  5. Hyperglycemia R/O DM type II  6. Evidence of proteinuria.     Assessment / Plan:     Neuro:  -SZ precautions   -Keep normothermic and normoglycemic.  -Get Neurology /Epilepsy consultation   -Place continuous EEG x 24hrs.  -Consider MRI of the brain with and without GADO in 24hrs.  -Check B12 /Folate and B6 level.  -Ammonia serum level.  -Continue Keppra 500mg q 12hrs.  -Get ABG.  -Correct acidosis if significant.  -TTE  -12 lead ECG  -Consider Bicarb IVD  120 cc /hr IVD  -Mucomyst 600mg q 12hrs enteral x 48hrs.  -Send blood cultures.  -Get pro calcitonin level.  -Consider LP and send for bacteriological and virological analysis. Measure opening pressure and closing pressure.  Send for Bacterial and fungal stains and cultures as well as for HSV and HZV PCRs   -Start Insulin IVD if BS constantly above 180.  -HgA1C.:  -No admission to Neuro ICU at this point.        Uninterrupted level 3  Follow-up /Counseling Time (not including procedures):  = 35 min                         Lan Esquivel MD, MPH, FAHA,  Neurocritical Care Department Faculty      Activity Orders          None        No Order    Lan Esquivel MD  Neurocritical Care  [facility]

## 2017-09-26 NOTE — ED NOTES
Pt resting on stretcher, respirations even and unlabored. Stretcher locked and in lowest position, side rails x 2, call bell within reach. Family at the bedside.. EEG monitor in place continuously. BP cuff, pulse ox and cardiac monitor in place continuously. Will reevaluate pt.

## 2017-09-26 NOTE — ED NOTES
Patient pulled out IV access and removed his EEG cap. Will notify admitting team. Waiting on [facility] to evaluate patient and admit, per Dr. Mittal
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
## 2017-09-26 NOTE — PLAN OF CARE
66 y/o male with hx of HTN presents to ED [facility] for seizure activity.  Pt has no hx of seizure disorder.  He was riding to work with his nephew this morning in [employer] and had a seizure. The nephew pulled over and called 911. Nephew describes seizure as stiffness, generalized body shaking lasted ~ 4 minutes, followed by deep sleep. Upon EMS arrival, pt was seizing but resolved prior to receiving any medication.  Upon arrival to ED, pt began having a tonic clonic seizure.  Neurology consulted for eval. Patient seen and examined with Dr Burns. Patient oriented only to person, agitated, poor cooperative with exam. Pupils equal bilaterally and reactive to light. Facial motion symmetric. Patient moves all 4 extremities spontaneously. Unable to test strength due to poor participation.    Assessment/Plan:  66 y/o male with new onset of seizure of unknown etiology. No sign of infection, leukocytosis (likely reactive 2/2 seizures), no electrolytes abnormalities. No focal deficits on exam suggestive of acute stroke. CT head negative for acute findings. No hx of alcohol or use of benzos.    Loaded with Keppra 2000 mg x1. Ativan 1mg given in ER.  Patient remains confused, agitated.    Post-ictal vs SE--- We recommend EEG stat to r/o SE.    Pt seen and discussed with Dr Burns.
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
## 2017-09-26 NOTE — PLAN OF CARE
Problem: Patient Care Overview  Goal: Plan of Care Review  Outcome: Ongoing (interventions implemented as appropriate)  POC reviewed with pt and spouse at 0130;  Both able to verbalize acceptance and understanding.  Pt's VS stable.  AAOx4.  Questions answered/encouraged; reassurance provided.  Call light in reach, side rails up x4, nonskid socks on, bed alarm set, bed in lowest position with wheels locked, urinal and wife at bedside.  TEDs/SCDs applied.  Remains free from falls, skin breakdown, and injury.  Denies pain.  Portable EEG monitoring ongoing.  Seizure precautions maintained:  Padded side rails up x4, O2 and suction at bedside.  No acute events so far this morning.  Will continue to monitor.
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
## 2017-09-26 NOTE — SUBJECTIVE & OBJECTIVE
Past Medical History:   Diagnosis Date    Hypertension     Seizures      History reviewed. No pertinent surgical history.    Review of patient's allergies indicates:  No Known Allergies    Current Facility-Administered Medications on File Prior to Encounter   Medication    [COMPLETED] levetiracetam (KEPPRA) 2,000 mg in dextrose 5 % 100 mL IVPB    [COMPLETED] omnipaque 350 iohexol 100 mL    [DISCONTINUED] 0.9%  NaCl infusion    [DISCONTINUED] pyridoxine (vitamin B6) (B-6) 300 mg in sodium chloride 0.9% 50 mL IVPB     No current outpatient prescriptions on file prior to encounter.     Family History     None        Social History Main Topics    Smoking status: Never Smoker    Smokeless tobacco: Never Used    Alcohol use No    Drug use: No    Sexual activity: Yes     Partners: Female     Review of Systems   Constitutional: Positive for activity change and fatigue. Negative for fever.   HENT: Negative for tinnitus, trouble swallowing and voice change.    Eyes: Negative for photophobia, pain, redness and visual disturbance.   Respiratory: Negative for shortness of breath.    Gastrointestinal: Negative for nausea and vomiting.   Musculoskeletal: Negative for gait problem.   Allergic/Immunologic: Negative for immunocompromised state.   Neurological: Positive for seizures. Negative for dizziness, facial asymmetry, speech difficulty, weakness, light-headedness, numbness and headaches.   Psychiatric/Behavioral: Positive for confusion.     Objective:     Vital Signs (Most Recent):  Temp: 97.6 °F (36.4 °C) (09/26/17 1220)  Pulse: 69 (09/26/17 1220)  Resp: 16 (09/26/17 1220)  BP: (!) 185/92 (09/26/17 1220)  SpO2: 95 % (09/26/17 1220) Vital Signs (24h Range):  Temp:  [97.6 °F (36.4 °C)-99.8 °F (37.7 °C)] 97.6 °F (36.4 °C)  Pulse:  [] 69  Resp:  [14-21] 16  SpO2:  [94 %-97 %] 95 %  BP: (130-185)/() 185/92     Weight: 109.8 kg (242 lb)  Body mass index is 29.46 kg/m².    Physical Exam   Constitutional: He  appears well-developed and well-nourished. No distress.   HENT:   Head: Normocephalic and atraumatic.   Eyes: Conjunctivae and EOM are normal. Pupils are equal, round, and reactive to light.   Neck: Neck supple.   Pulmonary/Chest: Effort normal.   Musculoskeletal: Normal range of motion.   Neurological: He has a normal Finger-Nose-Finger Test.   Reflex Scores:       Tricep reflexes are 2+ on the right side and 2+ on the left side.       Bicep reflexes are 2+ on the right side and 2+ on the left side.       Brachioradialis reflexes are 2+ on the right side and 2+ on the left side.  Skin: He is not diaphoretic.   Psychiatric: His speech is normal.     NEUROLOGICAL EXAMINATION:     MENTAL STATUS   Oriented to person.   Oriented to place.   Disoriented to year. Oriented to month. (Said 1917 then corrected to 2017 )  Follows 2 step commands.   Concentration: normal.   Speech: speech is normal   Level of consciousness: alert  Normal comprehension.     CRANIAL NERVES     CN III, IV, VI   Pupils are equal, round, and reactive to light.  Extraocular motions are normal.   Right pupil: Shape: regular. Reactivity: brisk.   Left pupil: Shape: regular. Reactivity: brisk.   Nystagmus: none   Diplopia: none  Ophthalmoparesis: none    CN V   Facial sensation intact.     CN VII   Facial expression full, symmetric.     CN VIII   CN VIII normal.     CN IX, X   Palate: symmetric    CN XI   CN XI normal.     CN XII   CN XII normal.   Tongue atrophy: no bite marks.    MOTOR EXAM   Muscle bulk: normal  Overall muscle tone: normal  Right arm pronator drift: absent  Left arm pronator drift: absent    Strength   Right deltoid: 5/5  Left deltoid: 5/5  Right biceps: 5/5  Left biceps: 5/5  Right triceps: 5/5  Left triceps: 5/5  Right interossei: 5/5  Left interossei: 5/5  Right iliopsoas: 5/5  Left iliopsoas: 5/5  Right anterior tibial: 5/5  Left anterior tibial: 5/5  Right peroneal: 5/5  Left peroneal: 5/5    REFLEXES     Reflexes   Right  brachioradialis: 2+  Left brachioradialis: 2+  Right biceps: 2+  Left biceps: 2+  Right triceps: 2+  Left triceps: 2+    SENSORY EXAM   Light touch normal.   Vibration normal.     GAIT AND COORDINATION      Coordination   Finger to nose coordination: normal    Tremor   Resting tremor: absent    Significant Labs:   Hemoglobin A1c:   Recent Labs  Lab 09/26/17  1218   HGBA1C 5.7*     Blood Culture:   Recent Labs  Lab 09/26/17  0403   LABBLOO No Growth to date  No Growth to date     CBC:   Recent Labs  Lab 09/25/17  0900 09/26/17  1218   WBC 14.91* 8.75   HGB 16.1 17.0   HCT 47.9 49.7    239     CMP:   Recent Labs  Lab 09/25/17  0900 09/26/17  0857   * 110    142   K 4.0 3.8    109   CO2 12* 24   BUN 13 15   CREATININE 1.4 1.2   CALCIUM 9.7 9.4   MG 2.7* 2.2   PROT 8.6* 7.5   ALBUMIN 3.8 3.1*   BILITOT 0.5 1.4*   ALKPHOS 112 87   AST 35 72*   ALT 34 39   ANIONGAP 21* 9   EGFRNONAA 52* >60.0     Inflammatory Markers:   Recent Labs  Lab 09/26/17  0403   PROCAL 0.78*     Urine Culture: No results for input(s): LABURIN in the last 48 hours.  Urine Studies:   Recent Labs  Lab 09/25/17  0959   COLORU Yellow   APPEARANCEUA Clear   PHUR 6.0   SPECGRAV >=1.030*   PROTEINUA 1+*   GLUCUA Negative   KETONESU Negative   BILIRUBINUA Negative   OCCULTUA 2+*   NITRITE Negative   UROBILINOGEN Negative   LEUKOCYTESUR Negative   RBCUA 2   WBCUA 0   BACTERIA None   SQUAMEPITHEL 2   HYALINECASTS 0     All pertinent lab results from the past 24 hours have been reviewed.    Significant Imaging: I have reviewed and interpreted all pertinent imaging results/findings within the past 24 hours.     CT Head WO contrast (9/25/17):  Findings: Examination is slightly motion limited.  There is no midline shift, hydrocephalus, or mass effect.  There is no evidence of acute intracranial hemorrhage or acute major vascular territory infarct.  There is no evidence of large mass or large abnormal extra-axial fluid collection.  The  calvarium is intact.  Visualized paranasal sinuses and mastoid air cells are clear.    CTA Head and Neck (9/25/17):  There is a two-vessel arch.  The origins of the bilateral common carotid arteries and internal carotid arteries are widely patent as are the vessels themselves.  The bilateral vertebral artery origins are patent as are the vessels themselves.  There is atherosclerotic calcification of the intracranial portions of the ICA bilaterally.  There is mild narrowing of the right A2 segment along its medial aspect, without occlusion.  Otherwise, no hemodynamically significant stenosis, occlusion, AV malformation, or aneurysm of the anterior circulation.  There is irregularity of the bilateral PCAs noting a short segment of 70-80% stenosis of the P2 segment of the right PCA without occlusion.  The remainder of the posterior circulation is widely patent.  Bilateral posterior communicating arteries are identified.  The dural venous sinuses appear grossly patent allowing for phase of contrast.

## 2017-09-26 NOTE — SUBJECTIVE & OBJECTIVE
Past Medical History:   Diagnosis Date    Hypertension     Seizures        History reviewed. No pertinent surgical history.    Review of patient's allergies indicates:  No Known Allergies    Current Facility-Administered Medications on File Prior to Encounter   Medication    [COMPLETED] 0.9%  NaCl infusion    [COMPLETED] levetiracetam (KEPPRA) 2,000 mg in dextrose 5 % 100 mL IVPB    [COMPLETED] levetiracetam (KEPPRA) 2,000 mg in dextrose 5 % 100 mL IVPB    [COMPLETED] lorazepam (ATIVAN) 2 mg/mL injection    [COMPLETED] omnipaque 350 iohexol 100 mL    [DISCONTINUED] 0.9%  NaCl infusion    [DISCONTINUED] pyridoxine (vitamin B6) (B-6) 300 mg in sodium chloride 0.9% 50 mL IVPB     No current outpatient prescriptions on file prior to encounter.     Family History     None        Social History Main Topics    Smoking status: Never Smoker    Smokeless tobacco: Never Used    Alcohol use No    Drug use: No    Sexual activity: Yes     Partners: Female     Review of Systems   Constitutional: Positive for activity change. Negative for appetite change, chills, fatigue and fever.   HENT: Negative for ear pain and sore throat.    Eyes: Negative for pain, redness and visual disturbance.   Respiratory: Negative for cough, chest tightness and shortness of breath.    Cardiovascular: Negative for chest pain, palpitations and leg swelling.   Gastrointestinal: Negative for abdominal pain, constipation, diarrhea and vomiting.   Endocrine: Positive for polyuria.   Genitourinary: Positive for frequency. Negative for difficulty urinating.   Musculoskeletal: Negative for neck pain and neck stiffness.   Skin: Negative for rash and wound.   Neurological: Positive for seizures. Negative for dizziness, weakness and headaches (resolved).   Psychiatric/Behavioral: Positive for confusion and decreased concentration. Negative for agitation. The patient is not nervous/anxious and is not hyperactive.      Objective:     Vital Signs (Most  Recent):  Temp: 98 °F (36.7 °C) (09/25/17 1817)  Pulse: 85 (09/26/17 0040)  Resp: 17 (09/26/17 0040)  BP: (!) 156/84 (09/25/17 2331)  SpO2: 97 % (09/26/17 0037) Vital Signs (24h Range):  Temp:  [98 °F (36.7 °C)-99.1 °F (37.3 °C)] 98 °F (36.7 °C)  Pulse:  [] 85  Resp:  [14-21] 17  SpO2:  [94 %-97 %] 97 %  BP: (156-209)/() 156/84     Weight: 109.8 kg (242 lb)  Body mass index is 29.46 kg/m².    Physical Exam   Constitutional: He appears well-developed and well-nourished.   AAM comfortably resting in bed with wife at bedside   HENT:   Head: Normocephalic and atraumatic.   Eyes: Conjunctivae and EOM are normal. Pupils are equal, round, and reactive to light.   R  upper eyelid erythematous, edematous.  No chemosis, discharge, or pain.     Neck: Normal range of motion. Neck supple.   Cardiovascular: Normal rate, regular rhythm and normal heart sounds.    No murmur heard.  Pulmonary/Chest: Effort normal and breath sounds normal. No respiratory distress.   Abdominal: Soft. Bowel sounds are normal. He exhibits no distension. There is no tenderness.   Musculoskeletal: He exhibits no edema or deformity.   Limited by lethargy   Neurological:   Lethargic.  Oriented to date, person but not place, president. Arousable to voice but short attention span for questions.   Skin: Skin is warm and dry.   Psychiatric: His mood appears not anxious. His affect is not angry. He is not aggressive and not hyperactive. Thought content is not delusional. He is communicative.   Nursing note and vitals reviewed.       Significant Labs:   A1C: No results for input(s): HGBA1C in the last 4320 hours.  CBC:   Recent Labs  Lab 09/25/17  0900   WBC 14.91*   HGB 16.1   HCT 47.9        CMP:   Recent Labs  Lab 09/25/17  0900      K 4.0      CO2 12*   *   BUN 13   CREATININE 1.4   CALCIUM 9.7   PROT 8.6*   ALBUMIN 3.8   BILITOT 0.5   ALKPHOS 112   AST 35   ALT 34   ANIONGAP 21*   EGFRNONAA 52*     Lactic Acid:   Recent  Labs  Lab 09/25/17  0900 09/25/17  1321   LACTATE >12.0* 3.1*     Magnesium:   Recent Labs  Lab 09/25/17  0900   MG 2.7*     POCT Glucose:   Recent Labs  Lab 09/25/17  1445   POCTGLUCOSE 88     Troponin:   Recent Labs  Lab 09/25/17  0900   TROPONINI <0.006     Urine Studies:   Recent Labs  Lab 09/25/17  0959   COLORU Yellow   APPEARANCEUA Clear   PHUR 6.0   SPECGRAV >=1.030*   PROTEINUA 1+*   GLUCUA Negative   KETONESU Negative   BILIRUBINUA Negative   OCCULTUA 2+*   NITRITE Negative   UROBILINOGEN Negative   LEUKOCYTESUR Negative   RBCUA 2   WBCUA 0   BACTERIA None   SQUAMEPITHEL 2   HYALINECASTS 0       Significant Imaging: CT: I have reviewed all pertinent results/findings within the past 24 hours and my personal findings are:  No acute infarct or hemorrhage.  CXR: I have reviewed all pertinent results/findings within the past 24 hours and my personal findings are:  No acute finding

## 2017-09-26 NOTE — HOSPITAL COURSE
9/25: The patient is more awake and is following commands. Initial lactate was > 12 and now 3.1. Drug and tox screen was negative. The patient had significant metabolic acidosis. BUN/Cr 13/1.4, CPK was 125.

## 2017-09-26 NOTE — PLAN OF CARE
Met w pt and family at [facility]urology  in room just finishing up assessment but said that the Plan will be discussed with Dr. Hermosillo  Pt has ride home per family but he didn't state whom.     09/26/17 1012   Discharge Assessment   Assessment Type Discharge Planning Assessment   Confirmed/corrected address and phone number on facesheet? Yes   Assessment information obtained from? Patient;Caregiver   Communicated expected length of stay with patient/caregiver yes   Prior to hospitilization cognitive status: Alert/Oriented;No Deficits   Prior to hospitalization functional status: Independent   Current cognitive status: Alert/Oriented;No Deficits   Current Functional Status: Independent   Lives With spouse   Able to Return to Prior Arrangements yes   Is patient able to care for self after discharge? Yes   Who are your caregiver(s) and their phone number(s)? Sanjay Luz wife [phone]   Patient's perception of discharge disposition home or selfcare   Readmission Within The Last 30 Days no previous admission in last 30 days   Patient currently being followed by outpatient case management? No   Patient currently receives any other outside agency services? No   Equipment Currently Used at Home none   Do you have any problems affording any of your prescribed medications? No   Is the patient taking medications as prescribed? yes   Does the patient have transportation home? Yes   Transportation Available family or friend will provide   Does the patient receive services at the [facility]? No   Discharge Plan A Home with family   Discharge Plan B Home with family   Patient/Family In Agreement With Plan yes     [facility] provided primary care needs

## 2017-09-27 NOTE — PLAN OF CARE
"Per neurology notes, "Follow-up in Neuro clinic with Dr. Hermosillo for further AED management."  Per chart notes, pt dc late yesterday evening.  CM called Neurology Clinic for above appt to be made.  Future Appointments  Date Time Provider Department Center   10/30/2017 2:20 PM Gustavo Hermosillo MD [facility]UR[facility]   1st available appt made per .       09/27/17 0819   Final Note   Assessment Type Final Discharge Note   Discharge Disposition Home   Hospital Follow Up  Appt(s) scheduled? Yes   Discharge plans and expectations educations in teach back method with documentation complete? Yes   Right Care Referral Info   Post Acute Recommendation No Care     "

## 2017-09-28 PROBLEM — E87.20 LACTIC ACIDOSIS: Status: RESOLVED | Noted: 2017-09-28 | Resolved: 2017-09-26

## 2017-09-28 PROBLEM — I10 BENIGN ESSENTIAL HTN: Status: ACTIVE | Noted: 2017-09-28

## 2017-09-28 PROBLEM — R80.9 PROTEINURIA: Status: ACTIVE | Noted: 2017-09-28

## 2017-09-28 PROBLEM — R73.9 HYPERGLYCEMIA: Status: RESOLVED | Noted: 2017-09-28 | Resolved: 2017-09-26

## 2017-09-28 PROBLEM — G93.40 ENCEPHALOPATHY ACUTE: Status: RESOLVED | Noted: 2017-09-28 | Resolved: 2017-09-26

## 2017-09-29 LAB
BACTERIA BLD CULT: NORMAL

## 2017-09-29 NOTE — DISCHARGE SUMMARY
[facility] Hospital Medicine  Discharge Summary      Patient Name: Salas Luz  MRN: [id number]  Admission Date: 9/25/2017  Hospital Length of Stay: 0 days  Discharge Date and Time: 9/26/2017  6:28 PM  Attending Physician: Hanane att. providers found   Discharging Provider: Vanessa Anna Care Provider: Primary Doctor Hanane    [facility] Medicine Team: [facility] MED L Unique Levy MD     Principal Problem:Seizures     Chief Complaint:        Chief Complaint   Patient presents with    Seizures       new onset seizures today.  per family 3 seizures today before admission to ER then per nurse multiple tonic clonic seizures witnessed in ER.  pt received loading dose of Keppra in er then additional 2g of keppra enroute.  pt is still agitated however will be evaluated by EEG to determine if needs epilepsy or neuro icu         HPI: Salas Luz is a 67 y.o. AAM with pmhx of HTN who presents to the ED for evaluation of seizure-like activity.  The patient's wife states that the patient was riding as a passenger in a truck with his cousin during work today when his breathing changed and he became unconscious.  Pt states that he urinated on himself and bit his tongue during the event.  Per wife, he also had two more episdoes of seizure-like activity during EMS ride.  The patient does not remember the episodes. Pt denies CP, SOB, dizziness.   For the past 2-3 months, patient has been experiencing more numerous episodes of headaches after finishing last script of anti-hypertensive meds.  Pt's wife does not remember what medication he was taking but reports financial/logistical reasons to obtaining HTN meds.  Reports episodes of polyuria.  Denies hx of diabetes, kidney issues, drug use, ETOH use, head trauma. Patient is lethargic and confused during interview, but can follow commands.       Patient was transferred from [facility] to have epilepsy evaluation and EEG monitoring.  The patient was given  "Keppra 4gr before arrival.  In [facility] ED, CXR, CT and CTA of head outside hospital were WNL. Labs show WBCs 14.91, CO2 12, lactate 3.1, Anion gap 21, glucose 201. Troponin negative.  UA showed proteinuria, hematuria.  Urine tox negative.  Most recent BP shows 156/84, HR 93, SpO2 97% on RA.  [facility] has evaluated the patient and given reccs.         * No surgery found *      Indwelling Lines/Drains at time of discharge:   Lines/Drains/Airways          No matching active lines, drains, or airways        Hospital Course:      Pt was admitted to medicine. Witnessed seizure event in car 9/25 described as "stiffening of upper extremities then generalized shaking", confusion and lethargy. Nephew called EMS and was reportedly seizing on arrival. He was brought to [facility] ED where he was noted to have at least 2 tonic-clonic events and loaded with Keppra 2 g x 1 and ativan 1 mg. No hx of seizures, head trauma with LOC or CNS infections. At [facility], he was combative and pulling at EEG leads. Extended EEG (7 hr 59 min) was limited due to artifact, but did not capture ictal discharges. He was started on Keppra 500 mg BID and transferred to [facility]. Neurology consulted  and recommended to repeat EEG. CT Head and CTA Head/Neck unremarkable for acute intracranial pathology. He has been afebrile and elevated WBC count normalized (likely reactive leukocytosis) with unremarkable UA and CXR. Patient back to cognitive baseline with non-focal neuro exam on 9/26. Repeat EEG was negative. Neuro reccomended discharge w/o any AEDs and follow up in clinic given first possible seizure event.         Consults:   Consults         Status Ordering Provider     Inpatient consult to Epilepsy  Once     Provider:  (Not yet assigned)    Completed ESTHER NARANJO     Inpatient consult to Neurology  Once     Provider:  (Not yet assigned)    Completed ESTHER NARANJO          Significant Diagnostic Studies: EEG    EEG FINDINGS:  Recording was " obtained at the patient's bedside in room 728.    Recording had been initiated the night before using electrode cap to an   immediate survey of electrocortical function.  There was some movement artifact   noted intermittently as the patient was moving around.  There was a very   prominent 9 to 10 Hz frequency, which was present in the mid and posterior head   regions and at times even extended in the frontal area.  There are intermixed   theta frequencies.  There was intermixed slower theta noted over the left   hemisphere.  There was no spike or sharp wave activity noted.  Activation   procedures were not carried out.     IMPRESSION:  Probably abnormal EEG with what appeared to be asymmetrical   findings with slower frequencies noted over the left hemisphere, which would   suggest a lateralized cortical dysfunction.  There is, however, quite a bit of   movement artifact present, which does limit the interpretability of the   recording.  No active seizures were recorded.     CLINICAL CORRELATION:  The patient is a 67-year-old male who came to the   Emergency Room for recurrent seizures and was reported to have multiple   tonic-clonic seizures while in the Emergency Room.  This tracing does show some   cortical dysfunction, which is felt to be more evident on the left side;   however, there was considerable movement, which partially limits the   interpretability of the recording.  No epileptic activity was recorded.    Pending Diagnostic Studies:     Procedure Component Value Units Date/Time    Ammonia [[id number]] Collected:  09/26/17 1219    Order Status:  Sent Lab Status:  In process Updated:  09/26/17 1219    Specimen:  Blood from Blood     Narrative:       Collection has been rescheduled by ARABELLA at 9/26/2017 02:45 Reason:   please draw 330 labs spoke with Xochilt  Collection has been rescheduled by RUTH ANN at 9/26/2017 04:04 Reason:   difficult stick  Collection has been rescheduled by DYLAN at 9/26/2017 06:45 Reason: 3    techs attempted this patient; could not collect; notified nurse Xochilt   who will notify Dr.  Collection has been rescheduled by SRO at 9/26/2017 09:09 Reason: not   able to collect all labs  Yahir said send someone else    Beta - Hydroxybutyrate, Serum [[id number]] Collected:  09/26/17 1219    Order Status:  Sent Lab Status:  In process Updated:  09/26/17 1219    Specimen:  Blood from Blood     Narrative:       Collection has been rescheduled by KW at 9/26/2017 06:45 Reason: 3   techs attempted this patient; could not collect; notified nurse Xochilt   who will notify Dr.  Collection has been rescheduled by SRO at 9/26/2017 09:09 Reason: not   able to collect all labs  Yahir said send someone else    CBC auto differential [[id number]] Collected:  09/26/17 1219    Order Status:  Sent Lab Status:  In process Updated:  09/26/17 1219    Specimen:  Blood from Blood     Narrative:       Collection has been rescheduled by AMM1 at 9/26/2017 02:45 Reason:   please draw 330 labs spoke with Xochilt  Collection has been rescheduled by JDI at 9/26/2017 04:04 Reason:   difficult stick  Collection has been rescheduled by KW at 9/26/2017 06:45 Reason: 3   techs attempted this patient; could not collect; notified nurse Xochilt   who will notify Dr.  Collection has been rescheduled by SRO at 9/26/2017 09:09 Reason: not   able to collect all labs  Yahir said send someone else    Hemoglobin A1c [[id number]] Collected:  09/26/17 1219    Order Status:  Sent Lab Status:  In process Updated:  09/26/17 1219    Specimen:  Blood from Blood     Narrative:       Collection has been rescheduled by AMM1 at 9/26/2017 02:45 Reason:   please draw 330 labs spoke with Xochilt  Collection has been rescheduled by JDI at 9/26/2017 04:04 Reason:   difficult stick  Collection has been rescheduled by KW at 9/26/2017 06:45 Reason: 3   techs attempted this patient; could not collect; notified nurse Xochilt   who will notify DrSofy  Collection has been  rescheduled by SRO at 9/26/2017 09:09 Reason: not   able to collect all labs nurse Sinclair said send someone else    Lactic acid, plasma [[id number]] Collected:  09/26/17 1219    Order Status:  Sent Lab Status:  In process Updated:  09/26/17 1219    Specimen:  Blood from Blood     Narrative:       Collection has been rescheduled by KW at 9/26/2017 06:45 Reason: 3   techs attempted this patient; could not collect; notified nurse Xochilt   who will notify DrSofy  Collection has been rescheduled by SRO at 9/26/2017 09:09 Reason: not   able to collect all labs nurse Sinclair said send someone else    Vitamin B6 [[id number]] Collected:  09/26/17 1219    Order Status:  Sent Lab Status:  In process Updated:  09/26/17 1219    Specimen:  Blood from Blood     Narrative:       Collection has been rescheduled by AMM1 at 9/26/2017 02:45 Reason:   please draw 330 labs spoke with Xochilt  Collection has been rescheduled by JDI at 9/26/2017 04:04 Reason:   difficult stick  Collection has been rescheduled by KW at 9/26/2017 06:45 Reason: 3   techs attempted this patient; could not collect; notified nurse Xochilt   who will notify DrSofy  Collection has been rescheduled by SRO at 9/26/2017 09:09 Reason: not   able to collect all labs nurse Sinclair said send someone else        Final Active Diagnoses:    Diagnosis Date Noted POA    Benign essential HTN [I10] 09/28/2017 Yes    Proteinuria [R80.9] 09/28/2017 Yes      Problems Resolved During this Admission:    Diagnosis Date Noted Date Resolved POA    PRINCIPAL PROBLEM:  Seizures [R56.9] 09/25/2017 09/26/2017 Yes    Lactic acidosis [E87.2] 09/28/2017 09/26/2017 Yes    Hyperglycemia [R73.9] 09/28/2017 09/26/2017 Yes    Encephalopathy acute [G93.40] 09/28/2017 09/26/2017 Yes      Discharged Condition: good    Disposition: Home or Self Care    Follow Up:  Follow-up Information     Schedule an appointment as soon as possible for a visit with [facility] Cristian Kahn.    Contact  information:  [address], [address]  [place] [facility] [place] [address]  [phone]                 Patient Instructions:     Ambulatory consult to Neurology   Referral Priority: Routine Referral Type: Consultation   Referral Reason: Specialty Services Required    Referred to Provider: COURTNEY SÁNCHEZ Requested Specialty: Neurology   Number of Visits Requested: 1      Ambulatory Referral to [facility]   Referral Priority: Urgent Referral Type: Consultation   Referral Reason: Specialty Services Required    Number of Visits Requested: 1      Diet general     Activity as tolerated     Call MD for:  temperature >100.4     Call MD for:  persistent nausea and vomiting or diarrhea     Call MD for:  severe uncontrolled pain     Call MD for:  redness, tenderness, or signs of infection (pain, swelling, redness, odor or green/yellow discharge around incision site)     Call MD for:  difficulty breathing or increased cough     Call MD for:  severe persistent headache     Call MD for:  worsening rash     Call MD for:  persistent dizziness, light-headedness, or visual disturbances     Call MD for:  increased confusion or weakness       Medications:  Reconciled Home Medications:   Discharge Medication List as of 9/26/2017  6:16 PM      CONTINUE these medications which have CHANGED    Details   amlodipine (NORVASC) 5 MG tablet Take 1 tablet (5 mg total) by mouth once daily., Starting Tue 9/26/2017, Until Thu 10/26/2017, Normal           Time spent on the discharge of patient: >30 minutes         Unique Levy MD  Department of Hospital Medicine  [facility]

## 2017-10-01 LAB — BACTERIA BLD CULT: NORMAL
